# Patient Record
(demographics unavailable — no encounter records)

---

## 2024-11-15 NOTE — HISTORY OF PRESENT ILLNESS
[IUD] : has an intrauterine device [N] : Patient is not sexually active [Y] : Positive pregnancy history [Menarche Age: ____] : age at menarche was [unfilled] [No] : Patient does not have concerns regarding sex [Previously active] : previously active [Mammogramdate] : 12/15/23 [TextBox_19] : BR1 [BreastSonogramDate] : 12/15/23 [TextBox_25] : BR2 [PapSmeardate] : 11/09/23 [TextBox_31] : NEG [TextBox_43] : NEVER [HPVDate] : 11/09/23 [TextBox_78] : NEG [LMPDate] : 11/04/24 [PGHxTotal] : 3 [[facility]FullTerm] : 2 [[facility]] : 2 [PGHxABInduced] : 1 [FreeTextEntry1] : 11/04/24

## 2025-03-06 NOTE — DISCUSSION/SUMMARY
[FreeTextEntry1] : 47 y/o with fibroid uterus, very light irregular bleeding s/p ablation and Mirena. Bleeding pattern likely secondary to Mirena and not concerning. Reviewed ultrasound images from today, Dahiana Bhandari in December, and form 2022. Dahiana images largely under calling myoma measurements, imaging appears more similar to today, and possible slight enlargement from 2022 which would be expected after 3 years. Overall no concerning findings - Discussed myomas and TVUS findings - Advised to keep Mirena as removal will likely precipitate heavy bleeding again from myomas. - Reviewed possible bulk symptoms from myomas, advised they may enlarge more over time but f she is asymptomatic, surgical intervention is not necessarily needed. If ever she gets worse bleeding or more bulk symptoms however, TLH would need to be considered. Pt expressed understanding. - Right ovarian cyst resolved, left cyst simple and less than 3 cm, no need to follow - Can return for annual as scheduled later this year and can have sono at that time to track myoma size. - Return for annual or as needed  Magno Flanagan MD

## 2025-03-06 NOTE — HISTORY OF PRESENT ILLNESS
[N] : Patient reports normal menses [Y] : Positive pregnancy history [No] : Patient does not have concerns regarding sex [Mammogramdate] : 12/16/2024 [TextBox_19] : BR1 [BreastSonogramDate] : 12/16/2024 [TextBox_25] : BR1 [PapSmeardate] : 11/09/2023 [TextBox_31] : WNL [HPVDate] : 11/09/2023 [TextBox_78] : NEG  [LMPDate] : 02/27/2025 [PGHxTotal] : 3 [[facility]] : 2 [facility] : 2 [PGHxABInduced] : 1 [FreeTextEntry1] : 02/27/2025

## 2025-03-06 NOTE — COUNSELING
[TextEntry] : Counseled patient on the pathophysiology if fibroids, their benign nature, and the symptoms they can cause including bleeding, bulk, and pain. Advised they tend to grow slowly over time then shrink after menopause. Advised that treatment if attempting to conceive are limited to myomectomy, but that if not attempting conception include hormonal management such as OCPs, progesterone, LNG IUD, or GNRH agonists/antagonists, uterine artery embolization of myoma, myomectomy, and hysterectomy.

## 2025-03-06 NOTE — RESULTS/DATA
[TextEntry] : TVUS today with normal right ovary, 2.9 cm left simple ovarian cyst, two notable fibroids, largest 6.7 cm fundal/anterior subserosal, also a 3.1 cm myoma